# Patient Record
Sex: MALE | Race: BLACK OR AFRICAN AMERICAN | NOT HISPANIC OR LATINO | URBAN - METROPOLITAN AREA
[De-identification: names, ages, dates, MRNs, and addresses within clinical notes are randomized per-mention and may not be internally consistent; named-entity substitution may affect disease eponyms.]

---

## 2019-03-22 ENCOUNTER — EMERGENCY (EMERGENCY)
Facility: HOSPITAL | Age: 19
LOS: 1 days | Discharge: HOME | End: 2019-03-22
Attending: EMERGENCY MEDICINE | Admitting: EMERGENCY MEDICINE
Payer: COMMERCIAL

## 2019-03-22 VITALS
DIASTOLIC BLOOD PRESSURE: 69 MMHG | HEART RATE: 59 BPM | OXYGEN SATURATION: 99 % | RESPIRATION RATE: 18 BRPM | SYSTOLIC BLOOD PRESSURE: 132 MMHG | TEMPERATURE: 98 F

## 2019-03-22 PROCEDURE — 99283 EMERGENCY DEPT VISIT LOW MDM: CPT | Mod: 25

## 2019-03-22 NOTE — ED ADULT TRIAGE NOTE - CHIEF COMPLAINT QUOTE
left testicle pain    c/o mild pain for a week... worse since yesterday. ..no swelling or redness but states feels warm to the touch.  denies dysuria, discharge, unprotected sex.

## 2019-03-23 VITALS
OXYGEN SATURATION: 100 % | RESPIRATION RATE: 16 BRPM | HEART RATE: 51 BPM | SYSTOLIC BLOOD PRESSURE: 114 MMHG | DIASTOLIC BLOOD PRESSURE: 43 MMHG

## 2019-03-23 LAB
APPEARANCE UR: CLEAR — SIGNIFICANT CHANGE UP
BILIRUB UR-MCNC: NEGATIVE — SIGNIFICANT CHANGE UP
BLOOD UR QL VISUAL: NEGATIVE — SIGNIFICANT CHANGE UP
C TRACH RRNA SPEC QL NAA+PROBE: SIGNIFICANT CHANGE UP
COLOR SPEC: YELLOW — SIGNIFICANT CHANGE UP
GLUCOSE UR-MCNC: NEGATIVE — SIGNIFICANT CHANGE UP
KETONES UR-MCNC: SIGNIFICANT CHANGE UP
LEUKOCYTE ESTERASE UR-ACNC: NEGATIVE — SIGNIFICANT CHANGE UP
N GONORRHOEA RRNA SPEC QL NAA+PROBE: SIGNIFICANT CHANGE UP
NITRITE UR-MCNC: NEGATIVE — SIGNIFICANT CHANGE UP
PH UR: 6.5 — SIGNIFICANT CHANGE UP (ref 5–8)
PROT UR-MCNC: 10 — SIGNIFICANT CHANGE UP
SP GR SPEC: 1.03 — SIGNIFICANT CHANGE UP (ref 1–1.04)
SPECIMEN SOURCE: SIGNIFICANT CHANGE UP
UROBILINOGEN FLD QL: SIGNIFICANT CHANGE UP

## 2019-03-23 PROCEDURE — 76870 US EXAM SCROTUM: CPT | Mod: 26

## 2019-03-23 NOTE — ED ADULT NURSE REASSESSMENT NOTE - NS ED NURSE REASSESS COMMENT FT1
pt A&Ox4, ambulatory, pt discharged, instructions provided with pt and parent. pt ambulated to ED exit without assist.

## 2019-03-23 NOTE — ED PROVIDER NOTE - NSFOLLOWUPINSTRUCTIONS_ED_ALL_ED_FT
1) Please follow-up with your primary care doctor in the next 1-2 days.  Please call tomorrow for an appointment.  If you cannot follow-up with your primary care doctor please return to the ED for any urgent issues.  2) You were given a copy of the tests performed today.  Please bring the results with you and review them with your primary care doctor.  3) If you have any worsening of symptoms or any other concerns please return to the ED immediately. This includes chest pain, shortness of breath, fever/chills, increased pain, or any other concerns.  4) Please continue taking your home medications as directed.    *urology and musculoskeletal pain referrals were given

## 2019-03-23 NOTE — ED PROVIDER NOTE - CLINICAL SUMMARY MEDICAL DECISION MAKING FREE TEXT BOX
Very low suspicion for torsion based on hx and PE, more likely epididymitis, no systemic sx, pt appears well, hd stable, US pending   Donna Alexander, PGY-2 EM

## 2019-03-23 NOTE — ED PROVIDER NOTE - PHYSICAL EXAMINATION
Gen: NAD, non-toxic, conversational  Eyes: PERRLA, EOMI   HENT: Normocephalic, atraumatic. External ears normal, no rhinorrhea, moist mucous membranes.   CV: RRR, no M/R/G  Resp: CTAB, non-labored, speaking without difficulty on room air  Abd: soft, non tender, non rigid, no guarding or rebound tenderness  Skin: dry, wwp   Neuro: AOx3, speech is fluent and appropriate Gen: NAD, non-toxic, conversational  Eyes: PERRLA, EOMI   HENT: Normocephalic, atraumatic. External ears normal, no rhinorrhea, moist mucous membranes.   CV: RRR, no M/R/G  Resp: CTAB, non-labored, speaking without difficulty on room air  Abd: soft, non tender, non rigid, no guarding or rebound tenderness  Gu: no testicular swelling, no redness or warmth, no penile discharge, exam performed by me with Dr. Hamlin as chaperone  Skin: dry, wwp   Neuro: AOx3, speech is fluent and appropriate

## 2019-03-23 NOTE — ED ADULT NURSE NOTE - NSIMPLEMENTINTERV_GEN_ALL_ED
Implemented All Universal Safety Interventions:  [place] to call system. Call bell, personal items and telephone within reach. Instruct patient to call for assistance. Room bathroom lighting operational. Non-slip footwear when patient is off stretcher. Physically safe environment: no spills, clutter or unnecessary equipment. Stretcher in lowest position, wheels locked, appropriate side rails in place.

## 2019-03-23 NOTE — ED ADULT NURSE NOTE - OBJECTIVE STATEMENT
pt received to room 25, c/o  testicular pain worsening x 2 days.  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 denies recent trauma, no pain with, or difficulty urinating. denies any unusual sexual contact. pain partially relieved with Aleve at home. pt HR noted to be in 40s-50s, pt states he is athletic and denies lightheadedness, dizziness, weakness.  urine samples sent to lab, pt pending US.

## 2019-03-23 NOTE — ED PROVIDER NOTE - ATTENDING CONTRIBUTION TO CARE
[place]: 17yo male with no significant medical history c/o one week of constant b/l testicular pain. No associated dysuria, penile discharge, fevers or chills. Pt currently having protected sex with one female partner but unprotected oral sex. No rectal pain, no abdominal pain, nausea or vomiting. Exam: GENERAL: well appearing, NAD, HEENT: MMM, PERRLA, CARDIO: +S1/S2, no murmurs, rubs or gallops, LUNGS: CTA B/L, no wheezing, rales or rhonchi, ABD: soft, nontender, BSx4 quadrants, no guarding or rigidity. : exam done by DR Alexander and chaperoned by me, no swelling or TTP of scrotum/testicles. No penile discharge. no rashes, perineum has no lesions or erythema. cremasteric reflex intact EXT: No LE edema or calf TTP, 2+ distal pulses x 4 extremities. NEURO: AxOx3, SKIN: no rashes or lesions, well perfused A/P- 17 yo male with testicular pain not concerning for torsion more likely epidymidis vs varicocele vs hydrocele. will obtain u/a, urine GC/Chlamydia, and scrotal US. Pt currently declining pain medication.

## 2019-03-23 NOTE — ED PROVIDER NOTE - OBJECTIVE STATEMENT
17 yo M denies pmh presents with testicular pain. Reports it has been going on for 1 weeks duration, worsened over the last 2 days. Reports 1 sexual partner, no hx of known std. Denies systemic sx including fever/chills, abd pain, urinary sx, n/v/d.

## 2020-01-22 NOTE — ED PROVIDER NOTE - NEURO NEGATIVE STATEMENT, MLM
Please call to schedule a follow up appointment [phone]. no loss of consciousness, no gait abnormality, no headache, no sensory deficits, and no weakness.

## 2024-04-07 ENCOUNTER — EMERGENCY (EMERGENCY)
Facility: HOSPITAL | Age: 24
LOS: 1 days | Discharge: HOME | End: 2024-04-07
Attending: EMERGENCY MEDICINE
Payer: COMMERCIAL

## 2024-04-07 VITALS
DIASTOLIC BLOOD PRESSURE: 68 MMHG | WEIGHT: 169.98 LBS | SYSTOLIC BLOOD PRESSURE: 131 MMHG | HEIGHT: 75 IN | OXYGEN SATURATION: 98 % | RESPIRATION RATE: 20 BRPM | HEART RATE: 82 BPM | TEMPERATURE: 98 F

## 2024-04-07 PROCEDURE — 99283 EMERGENCY DEPT VISIT LOW MDM: CPT

## 2024-04-08 VITALS
TEMPERATURE: 98 F | SYSTOLIC BLOOD PRESSURE: 113 MMHG | OXYGEN SATURATION: 97 % | HEART RATE: 64 BPM | RESPIRATION RATE: 18 BRPM | DIASTOLIC BLOOD PRESSURE: 75 MMHG

## 2024-04-08 PROCEDURE — 99283 EMERGENCY DEPT VISIT LOW MDM: CPT

## 2024-04-08 RX ORDER — AMOXICILLIN 250 MG/5ML
1 SUSPENSION, RECONSTITUTED, ORAL (ML) ORAL
Qty: 14 | Refills: 0
Start: 2024-04-08 | End: 2024-04-14

## 2024-04-08 RX ORDER — IBUPROFEN 200 MG
400 TABLET ORAL ONCE
Refills: 0 | Status: COMPLETED | OUTPATIENT
Start: 2024-04-08 | End: 2024-04-08

## 2024-04-08 RX ORDER — AMOXICILLIN 250 MG/5ML
500 SUSPENSION, RECONSTITUTED, ORAL (ML) ORAL ONCE
Refills: 0 | Status: COMPLETED | OUTPATIENT
Start: 2024-04-08 | End: 2024-04-08

## 2024-04-08 RX ADMIN — Medication 400 MILLIGRAM(S): at 00:48

## 2024-04-08 RX ADMIN — Medication 500 MILLIGRAM(S): at 00:48

## 2024-04-08 NOTE — ED ADULT NURSE NOTE - OBJECTIVE STATEMENT
24 y/o M , AXOX4, with no PMH, presents to the ED reporting R ear pain that began at 2100. Pt states "there is an organism in my ear and I feel it wiggling around." Pt endorsing 7/10 intermittent throbbing pain. The R ear has abrasions and dried blood. Pt denies trauma, dizziness, N/V, lethargy, dyspnea, chest pain. Pt breathing unlabored on room air, speaking in complete sentences, strong and equal strength in al extremities, sensations intact. Safety and comfort measures maintained. 24 y/o M , AXOX4, with no PMH, presents to the ED reporting R ear pain that began at 2100. Pt states "there is an organism in my ear and I feel it wiggling around." Pt endorsing 7/10 intermittent throbbing pain. The R ear has abrasions and dried blood from the pt scratching it. Pt denies trauma, dizziness, N/V, lethargy, dyspnea, chest pain. Pt breathing unlabored on room air, speaking in complete sentences, strong and equal strength in al extremities, sensations intact. Safety and comfort measures maintained.

## 2024-04-08 NOTE — ED PROVIDER NOTE - PHYSICAL EXAMINATION
GENERAL: NAD  HEENT:  Atraumatic.   Right TM: non erythematous canal. Bulging TM, erythematous TM  Left TM: TMs normal, non -erythematous, no canal narrowing  CHEST/LUNG: Chest rise equal bilaterally  HEART: Regular rate and rhythm  EXTREMITIES:  Extremities warm  PSYCH: A&Ox3  SKIN: No obvious rashes or lesions

## 2024-04-08 NOTE — ED PROVIDER NOTE - PATIENT PORTAL LINK FT
You can access the FollowMyHealth Patient Portal offered by [facility] by registering at the following website: [email or web address]. By joining [employer]’s FollowMyHealth portal, you will also be able to view your health information using other applications (apps) compatible with our system.

## 2024-04-08 NOTE — ED PROVIDER NOTE - CLINICAL SUMMARY MEDICAL DECISION MAKING FREE TEXT BOX
no FB noted to right ear. will give dose of amox and motrin here in ED. send home with amox rx. no FB noted to right ear. will give dose of amox and motrin here in ED. send home with amox rx.  Oliverio: 23 year old male bib mom for right ear pain 2.5 hours ago.  feels like fluid inside. no swimming, no drainage from ear.  no fever, no chills. pE: alert, nad, nonlabored respirations, + s1s2, right ear with dull tm, erythema R TM, no drainage in ear, left tm normal and intact, eomi, no facial tenderness, + s1s2, alert and oriented x 3 no focal deficits. skin intact. will give abx for om. recommend f/u with pmd.

## 2024-04-08 NOTE — ED PROVIDER NOTE - OBJECTIVE STATEMENT
22 y/o male pt no pmhx bib mother c/o right ear pain 2.5 hrs ago. States he feel something is crawling inside. Denies swimming, recently or sticking anything in his ear. Denies drainage, hearing loss, tinnitus, fevers, sinus/nasal congestion, cough.

## 2024-04-08 NOTE — ED PROVIDER NOTE - NSFOLLOWUPINSTRUCTIONS_ED_ALL_ED_FT
Otitis Media With Effusion, Adult  An ear, with close-ups of a normal middle ear and an inflamed middle ear filled with fluid.  Otitis media with effusion (OME) is inflammation and fluid (effusion) in the middle ear without having an ear infection. The middle ear is the space behind the eardrum. The middle ear is connected to the back of the throat by a narrow tube (eustachian tube). Normally the eustachian tube drains fluid out of the middle ear. A swollen eustachian tube can become blocked and cause fluid to collect in the middle ear.    OME often goes away without treatment. Sometimes OME can lead to hearing problems and recurrent acute ear infections (acute otitis media). These conditions may require treatment.    What are the causes?  OME is caused by a blocked eustachian tube. This can result from:  Allergies.  Upper respiratory infections.  Enlarged adenoids. The adenoids are areas of soft tissue located high in the back of the throat, behind the nose and the roof of the mouth. They are part of the body's natural defense system (immune system).  Rapid changes in pressure, like when an airplane is descending or during scuba diving.  In some cases, the cause of this condition is not known.    What are the signs or symptoms?  Common symptoms of this condition include:  A feeling of fullness in your ear.  Decreased hearing in the affected ear.  Fluid draining into the ear canal.  Pain in the ear.  In some cases, there are no symptoms.    How is this diagnosed?  A health care provider checks a person's ear using an otoscope. A close-up of the ear and otoscope is also shown.   A health care provider can diagnose OME based on signs and symptoms of the condition. Your provider will also do a physical exam to check for fluid behind the eardrum. During the exam, your health care provider will use an instrument called an otoscope to look in your ear.    Your health care provider may do other tests, such as:  A hearing test.  A tympanogram. This is a test that shows how well the eardrum moves in response to air pressure in the ear canal. It provides a graph for your health care provider to review.  A pneumatic otoscopy. This is a test to check how your eardrum moves in response to changes in pressure. It is done by squeezing a small amount of air into the ear.  How is this treated?  Treatment for OME depends on the cause of the condition and the severity of symptoms. The first step is often waiting to see if the fluid drains on its own in a few weeks. Home care treatment may include:  Over-the-counter pain relievers.  A warm, moist cloth placed over the ear.  Severe cases may require a procedure to insert tubes in the ears (tympanostomy tubes) to drain the fluid.    Follow these instructions at home:  Take over-the-counter and prescription medicines only as told by your health care provider.  Keep all follow-up visits.  Contact a health care provider if:  You have pain that gets worse.  Hearing in your affected ear gets worse.  You have fluid draining from your ear canal.  You have dizziness.  You develop a fever.  Get help right away if:  You develop a severe headache.  You completely lose hearing in the affected ear.  You have bleeding from your ear canal.  You have sudden and severe pain in your ear.  These symptoms may represent a serious problem that is an emergency. Do not wait to see if the symptoms will go away. Get medical help right away. Call your local emergency services (911 in the [place] Do not drive yourself to the hospital.    Summary  Otitis media with effusion (OME) is inflammation and fluid (effusion) in the middle ear without having an ear infection.  A swollen eustachian tube can become blocked and cause fluid to collect in the middle ear.  Treatment for OME depends on the cause of the condition and the severity of symptoms.  Many times, treatment is not needed because the fluid drains on its own in a few weeks.  Sometimes OME can lead to hearing problems and recurrent acute ear infections (acute otitis media), which may require treatment.  This information is not intended to replace advice given to you by your health care provider. Make sure you discuss any questions you have with your health care provider.

## 2024-12-20 ENCOUNTER — DOCTOR'S OFFICE (OUTPATIENT)
Facility: LOCATION | Age: 24
Setting detail: OPHTHALMOLOGY
End: 2024-12-20
Payer: COMMERCIAL

## 2024-12-20 DIAGNOSIS — H40.013: ICD-10-CM

## 2024-12-20 DIAGNOSIS — B30.9: ICD-10-CM

## 2024-12-20 PROCEDURE — 92133 CPTRZD OPH DX IMG PST SGM ON: CPT | Performed by: STUDENT IN AN ORGANIZED HEALTH CARE EDUCATION/TRAINING PROGRAM

## 2024-12-20 PROCEDURE — 92002 INTRM OPH EXAM NEW PATIENT: CPT | Performed by: STUDENT IN AN ORGANIZED HEALTH CARE EDUCATION/TRAINING PROGRAM

## 2024-12-20 ASSESSMENT — VISUAL ACUITY
OS_BCVA: 20/20
OD_BCVA: 20/20

## 2024-12-20 ASSESSMENT — CONFRONTATIONAL VISUAL FIELD TEST (CVF)
OD_FINDINGS: FULL
OS_FINDINGS: FULL